# Patient Record
Sex: FEMALE | URBAN - METROPOLITAN AREA
[De-identification: names, ages, dates, MRNs, and addresses within clinical notes are randomized per-mention and may not be internally consistent; named-entity substitution may affect disease eponyms.]

---

## 2017-12-05 ENCOUNTER — APPOINTMENT (RX ONLY)
Dept: URBAN - METROPOLITAN AREA CLINIC 77 | Facility: CLINIC | Age: 57
Setting detail: DERMATOLOGY
End: 2017-12-05

## 2017-12-05 DIAGNOSIS — R20.2 PARESTHESIA OF SKIN: ICD-10-CM

## 2017-12-05 DIAGNOSIS — L90.0 LICHEN SCLEROSUS ET ATROPHICUS: ICD-10-CM

## 2017-12-05 PROBLEM — H91.90 UNSPECIFIED HEARING LOSS, UNSPECIFIED EAR: Status: ACTIVE | Noted: 2017-12-05

## 2017-12-05 PROBLEM — L85.3 XEROSIS CUTIS: Status: ACTIVE | Noted: 2017-12-05

## 2017-12-05 PROBLEM — E78.5 HYPERLIPIDEMIA, UNSPECIFIED: Status: ACTIVE | Noted: 2017-12-05

## 2017-12-05 PROBLEM — D48.5 NEOPLASM OF UNCERTAIN BEHAVIOR OF SKIN: Status: ACTIVE | Noted: 2017-12-05

## 2017-12-05 PROBLEM — K21.9 GASTRO-ESOPHAGEAL REFLUX DISEASE WITHOUT ESOPHAGITIS: Status: ACTIVE | Noted: 2017-12-05

## 2017-12-05 PROBLEM — M12.9 ARTHROPATHY, UNSPECIFIED: Status: ACTIVE | Noted: 2017-12-05

## 2017-12-05 PROBLEM — J30.1 ALLERGIC RHINITIS DUE TO POLLEN: Status: ACTIVE | Noted: 2017-12-05

## 2017-12-05 PROBLEM — I10 ESSENTIAL (PRIMARY) HYPERTENSION: Status: ACTIVE | Noted: 2017-12-05

## 2017-12-05 PROBLEM — L57.0 ACTINIC KERATOSIS: Status: ACTIVE | Noted: 2017-12-05

## 2017-12-05 PROCEDURE — 99202 OFFICE O/P NEW SF 15 MIN: CPT

## 2017-12-05 PROCEDURE — ? TREATMENT REGIMEN

## 2017-12-05 PROCEDURE — ? COUNSELING

## 2017-12-05 PROCEDURE — ? PRESCRIPTION

## 2017-12-05 RX ORDER — TACROLIMUS 1 MG/G
OINTMENT TOPICAL BID
Qty: 1 | Refills: 6 | Status: ERX | COMMUNITY
Start: 2017-12-05

## 2017-12-05 RX ORDER — GABAPENTIN 300 MG/1
CAPSULE ORAL
Qty: 30 | Refills: 6 | Status: ERX | COMMUNITY
Start: 2017-12-05

## 2017-12-05 RX ORDER — CLOBETASOL PROPIONATE 0.5 MG/G
OINTMENT TOPICAL
Qty: 1 | Refills: 6 | Status: ERX | COMMUNITY
Start: 2017-12-05

## 2017-12-05 RX ADMIN — CLOBETASOL PROPIONATE: 0.5 OINTMENT TOPICAL at 00:00

## 2017-12-05 RX ADMIN — TACROLIMUS: 1 OINTMENT TOPICAL at 00:00

## 2017-12-05 RX ADMIN — GABAPENTIN: 300 CAPSULE ORAL at 00:00

## 2017-12-05 ASSESSMENT — LOCATION SIMPLE DESCRIPTION DERM
LOCATION SIMPLE: LEFT BUTTOCK
LOCATION SIMPLE: VULVA

## 2017-12-05 ASSESSMENT — LOCATION ZONE DERM
LOCATION ZONE: TRUNK
LOCATION ZONE: VULVA

## 2017-12-05 ASSESSMENT — LOCATION DETAILED DESCRIPTION DERM
LOCATION DETAILED: LEFT LABIA MAJORA
LOCATION DETAILED: LEFT LATERAL BUTTOCK

## 2017-12-05 NOTE — PROCEDURE: TREATMENT REGIMEN
Plan: Discussed no visible rash or redness today. Consider MRI with neurologist if bothersome or no improvement
Detail Level: Zone
Plan: Consider ILK injections
Otc Regimen: Recommended gentle soaps and cleansers

## 2017-12-05 NOTE — HPI: OTHER
Condition:: Lichen Sclerosus
Please Describe Your Condition:: Patient states she has been diagnosed with biopsy proven Lichen Sclerosus on the ? for ? Years. She has tried Clobetasol cream, Triamcinolone 0.1% cream, Ultravate ointment, Desonide cream, Oxistat 0.05% cream, Lidpcaine ointment 5%, Halog 0.1% cram, Ministat, Vagisil, Vitamin A&D ointment, and [employer] without improvement. She also states that sometimes she gets left side pain and skin hurts a lot even with just touch with some burning sensation. She denies discomfort or pain with intercourse, only the normal slight itching.